# Patient Record
Sex: MALE | Race: WHITE | Employment: FULL TIME | URBAN - METROPOLITAN AREA
[De-identification: names, ages, dates, MRNs, and addresses within clinical notes are randomized per-mention and may not be internally consistent; named-entity substitution may affect disease eponyms.]

---

## 2021-05-14 ENCOUNTER — HOSPITAL ENCOUNTER (OUTPATIENT)
Age: 76
Discharge: HOME | End: 2021-05-14
Payer: COMMERCIAL

## 2021-05-14 VITALS
OXYGEN SATURATION: 99 % | RESPIRATION RATE: 12 BRPM | TEMPERATURE: 99 F | WEIGHT: 130 LBS | DIASTOLIC BLOOD PRESSURE: 60 MMHG | HEART RATE: 60 BPM | HEIGHT: 70 IN | BODY MASS INDEX: 18.61 KG/M2 | SYSTOLIC BLOOD PRESSURE: 114 MMHG

## 2021-05-14 DIAGNOSIS — U07.1 COVID-19: Primary | ICD-10-CM

## 2021-05-14 PROCEDURE — 99204 OFFICE O/P NEW MOD 45 MIN: CPT

## 2021-05-14 PROCEDURE — 99453 REM MNTR PHYSIOL PARAM SETUP: CPT

## 2021-05-14 NOTE — ED PROVIDER NOTES
Patient Seen in: [facility]      History   Patient presents with:  Fever    Stated Complaint: fever, fatigue, chills     HPI/Subjective:   HPI    20-year-old male who comes in today complaining of fever, chills generalized body aches and Regular rate and rhythm, no murmurs      ED Course     Labs Reviewed   RAPID SARS-COV-2 BY PCR - Abnormal; Notable for the following components:       Result Value    Rapid SARS-CoV-2 by PCR Detected (*)     All other components within normal limits clean and disinfect “high touch” surfaces, and frequent handwashing) according to CDC guidelines.                  Disposition and Plan     Clinical Impression:  [id number]  (primary encounter diagnosis)     Disposition:  There is no disposition on file for t

## 2021-05-14 NOTE — ED INITIAL ASSESSMENT (HPI)
Pt states having a fever, chills, fatigue and bodyaches since Monday. Denies any cough or sore throat.

## 2021-05-14 NOTE — ED QUICK NOTES
Vss, a/o x3, pt talktaive and smiling, pt has no complaints at present, pt given pulse ox and is with instructions on use

## 2021-05-14 NOTE — ED QUICK NOTES
Pt tolerated infusion. Pt denies any complaints at this time. Resting on stretcher in no acute distress. Wife at bedside.

## 2021-05-17 ENCOUNTER — TELEPHONE (OUTPATIENT)
Dept: CASE MANAGEMENT | Age: 76
End: 2021-05-17

## 2021-05-18 NOTE — TELEPHONE ENCOUNTER
Second attempt Left VM for patient to call back, provided [facility] phone number

## 2021-05-19 NOTE — TELEPHONE ENCOUNTER
3rd attempt to reach pt for home monitoring/post-infusion assessment. Left message for pt to call back. [facility] # left on voicemail. Encounter closing.

## 2022-01-09 ENCOUNTER — HOSPITAL ENCOUNTER (OUTPATIENT)
Age: 77
Discharge: HOME | End: 2022-01-09
Attending: EMERGENCY MEDICINE
Payer: COMMERCIAL

## 2022-01-09 VITALS
DIASTOLIC BLOOD PRESSURE: 67 MMHG | HEART RATE: 70 BPM | HEIGHT: 70 IN | SYSTOLIC BLOOD PRESSURE: 112 MMHG | WEIGHT: 135 LBS | RESPIRATION RATE: 18 BRPM | OXYGEN SATURATION: 97 % | BODY MASS INDEX: 19.33 KG/M2 | TEMPERATURE: 99 F

## 2022-01-09 DIAGNOSIS — U07.1 COVID-19: Primary | ICD-10-CM

## 2022-01-09 LAB — SARS-COV-2 RNA RESP QL NAA+PROBE: DETECTED

## 2022-01-09 PROCEDURE — 99213 OFFICE O/P EST LOW 20 MIN: CPT

## 2022-01-09 NOTE — ED INITIAL ASSESSMENT (HPI)
Patient c/o fever since last Friday,highest 101.6 and fatigue. Patient states that he feels better today. Patient had covid last May. Patient is not vaccinated.

## 2022-04-04 ENCOUNTER — HOSPITAL ENCOUNTER (OUTPATIENT)
Age: 77
Discharge: HOME | End: 2022-04-04
Attending: EMERGENCY MEDICINE
Payer: COMMERCIAL

## 2022-04-04 VITALS
SYSTOLIC BLOOD PRESSURE: 148 MMHG | HEART RATE: 76 BPM | RESPIRATION RATE: 18 BRPM | DIASTOLIC BLOOD PRESSURE: 83 MMHG | OXYGEN SATURATION: 97 % | TEMPERATURE: 98 F

## 2022-04-04 DIAGNOSIS — J06.9 UPPER RESPIRATORY TRACT INFECTION, UNSPECIFIED TYPE: Primary | ICD-10-CM

## 2022-04-04 LAB
POCT INFLUENZA A: NEGATIVE
POCT INFLUENZA B: NEGATIVE
SARS-COV-2 RNA RESP QL NAA+PROBE: NOT DETECTED

## 2022-04-04 PROCEDURE — 99213 OFFICE O/P EST LOW 20 MIN: CPT

## 2022-04-04 PROCEDURE — 87502 INFLUENZA DNA AMP PROBE: CPT | Performed by: EMERGENCY MEDICINE

## 2022-04-04 PROCEDURE — 99212 OFFICE O/P EST SF 10 MIN: CPT

## 2022-06-09 ENCOUNTER — HOSPITAL ENCOUNTER (OUTPATIENT)
Age: 77
Discharge: HOME | End: 2022-06-09
Payer: COMMERCIAL

## 2022-06-09 VITALS
OXYGEN SATURATION: 97 % | HEART RATE: 72 BPM | WEIGHT: 135 LBS | RESPIRATION RATE: 19 BRPM | TEMPERATURE: 98 F | SYSTOLIC BLOOD PRESSURE: 126 MMHG | BODY MASS INDEX: 19.33 KG/M2 | HEIGHT: 70 IN | DIASTOLIC BLOOD PRESSURE: 71 MMHG

## 2022-06-09 DIAGNOSIS — J10.1 INFLUENZA A: Primary | ICD-10-CM

## 2022-06-09 LAB — SARS-COV-2 RNA RESP QL NAA+PROBE: NOT DETECTED

## 2022-06-09 PROCEDURE — 99214 OFFICE O/P EST MOD 30 MIN: CPT

## 2022-06-09 PROCEDURE — 87502 INFLUENZA DNA AMP PROBE: CPT | Performed by: NURSE PRACTITIONER

## 2022-06-09 PROCEDURE — 94640 AIRWAY INHALATION TREATMENT: CPT

## 2022-06-09 RX ORDER — OSELTAMIVIR PHOSPHATE 75 MG/1
75 CAPSULE ORAL 2 TIMES DAILY
Qty: 10 CAPSULE | Refills: 0 | Status: SHIPPED | OUTPATIENT
Start: 2022-06-09 | End: 2022-06-14

## 2022-06-09 RX ORDER — ALBUTEROL SULFATE 90 UG/1
2 AEROSOL, METERED RESPIRATORY (INHALATION) ONCE
Status: COMPLETED | OUTPATIENT
Start: 2022-06-09 | End: 2022-06-09

## 2022-06-09 RX ORDER — ALBUTEROL SULFATE 90 UG/1
2 AEROSOL, METERED RESPIRATORY (INHALATION) EVERY 4 HOURS PRN
Qty: 18 G | Refills: 0 | Status: SHIPPED | OUTPATIENT
Start: 2022-06-09

## 2022-06-10 LAB — POCT INFLUENZA A: POSITIVE

## 2022-07-28 NOTE — ED PROVIDER NOTES
Patient Seen in: [facility] [place]      History   Patient presents with:  Fever  Fatigue    Stated Complaint: pt has a high fever  since friday     Subjective:   HPI    This is a 78-year-old male who presents with fevers since Friday.   Upon Sandra Pillow Regular rate and rhythm. S1 and S2. No murmurs, no rubs or gallops. ABDOMEN: Soft, nontender and nondistended. Normoactive bowel sounds. No rebound. No guarding. EXTREMITIES: Warm with brisk capillary refill.        ED Course     Labs Reviewed   RAPID S Home

## 2023-04-01 ENCOUNTER — APPOINTMENT (OUTPATIENT)
Dept: GENERAL RADIOLOGY | Age: 78
End: 2023-04-01
Attending: NURSE PRACTITIONER
Payer: COMMERCIAL

## 2023-04-01 ENCOUNTER — HOSPITAL ENCOUNTER (OUTPATIENT)
Age: 78
Discharge: HOME | End: 2023-04-01
Payer: COMMERCIAL

## 2023-04-01 VITALS
BODY MASS INDEX: 18.61 KG/M2 | TEMPERATURE: 98 F | SYSTOLIC BLOOD PRESSURE: 157 MMHG | OXYGEN SATURATION: 98 % | WEIGHT: 130 LBS | RESPIRATION RATE: 16 BRPM | DIASTOLIC BLOOD PRESSURE: 78 MMHG | HEIGHT: 70 IN | HEART RATE: 70 BPM

## 2023-04-01 DIAGNOSIS — S13.4XXA SPRAIN OF LIGAMENT OF CERVICAL SPINE REGION: Primary | ICD-10-CM

## 2023-04-01 PROCEDURE — 99213 OFFICE O/P EST LOW 20 MIN: CPT

## 2023-04-01 PROCEDURE — 72050 X-RAY EXAM NECK SPINE 4/5VWS: CPT | Performed by: NURSE PRACTITIONER

## 2023-04-01 PROCEDURE — 99214 OFFICE O/P EST MOD 30 MIN: CPT

## 2023-04-01 RX ORDER — ORPHENADRINE CITRATE 100 MG/1
100 TABLET, EXTENDED RELEASE ORAL 2 TIMES DAILY
Qty: 20 TABLET | Refills: 0 | Status: SHIPPED | OUTPATIENT
Start: 2023-04-01 | End: 2023-04-08

## 2023-04-01 RX ORDER — METHYLPREDNISOLONE 4 MG/1
TABLET ORAL
Qty: 21 TABLET | Refills: 0 | Status: SHIPPED | OUTPATIENT
Start: 2023-04-01

## 2023-04-15 ENCOUNTER — HOSPITAL ENCOUNTER (OUTPATIENT)
Age: 78
Discharge: HOME | End: 2023-04-15
Attending: EMERGENCY MEDICINE
Payer: COMMERCIAL

## 2023-04-15 VITALS
DIASTOLIC BLOOD PRESSURE: 81 MMHG | HEIGHT: 70 IN | TEMPERATURE: 98 F | WEIGHT: 130 LBS | SYSTOLIC BLOOD PRESSURE: 147 MMHG | RESPIRATION RATE: 16 BRPM | HEART RATE: 67 BPM | OXYGEN SATURATION: 98 % | BODY MASS INDEX: 18.61 KG/M2

## 2023-04-15 DIAGNOSIS — M62.838 NECK MUSCLE SPASM: Primary | ICD-10-CM

## 2023-04-15 PROCEDURE — 99214 OFFICE O/P EST MOD 30 MIN: CPT

## 2023-04-15 PROCEDURE — 99213 OFFICE O/P EST LOW 20 MIN: CPT

## 2023-04-15 RX ORDER — ORPHENADRINE CITRATE 100 MG/1
100 TABLET, EXTENDED RELEASE ORAL 2 TIMES DAILY
Qty: 20 TABLET | Refills: 0 | Status: SHIPPED | OUTPATIENT
Start: 2023-04-15 | End: 2023-04-22

## 2023-04-15 RX ORDER — METHYLPREDNISOLONE 4 MG/1
TABLET ORAL
Qty: 1 EACH | Refills: 0 | Status: SHIPPED | OUTPATIENT
Start: 2023-04-15

## 2023-04-15 NOTE — ED INITIAL ASSESSMENT (HPI)
Patient was seen 4/1 at [facility] for neck pain after sleeping \"funy\" had xray and was sent home with medication \"had immediate improvement\"  Last night patient states he was unusually tired and had taken a \"hilly walk\" in the evening states he slept funny and has neck pain and increase pain turning to the left. Trying to put head back is also painful.

## (undated) NOTE — LETTER
Date & Time: 6/9/2022, 7:47 PM  Patient: Kiley Kylah  Encounter Provider(s):    YOLY Coulter       To Whom It May Concern:    Tawana Robles was seen and treated in our department on 6/9/2022. He may return to work when fever free for 24 hours prior.      If you have any questions or concerns, please do not hesitate to call.        _____________________________  Physician/[facility] Signature